# Patient Record
Sex: FEMALE | Race: WHITE
[De-identification: names, ages, dates, MRNs, and addresses within clinical notes are randomized per-mention and may not be internally consistent; named-entity substitution may affect disease eponyms.]

---

## 2018-01-02 ENCOUNTER — HOSPITAL ENCOUNTER (OUTPATIENT)
Dept: HOSPITAL 45 - C.LAB | Age: 33
Discharge: HOME | End: 2018-01-02
Attending: SPECIALIST
Payer: COMMERCIAL

## 2018-01-02 DIAGNOSIS — Z3A.00: ICD-10-CM

## 2018-01-02 DIAGNOSIS — O09.00: Primary | ICD-10-CM

## 2018-01-04 ENCOUNTER — HOSPITAL ENCOUNTER (OUTPATIENT)
Dept: HOSPITAL 45 - C.ULTR | Age: 33
Discharge: HOME | End: 2018-01-04
Attending: SPECIALIST
Payer: COMMERCIAL

## 2018-01-04 DIAGNOSIS — N83.201: ICD-10-CM

## 2018-01-04 DIAGNOSIS — N97.9: Primary | ICD-10-CM

## 2018-01-04 DIAGNOSIS — N83.202: ICD-10-CM

## 2018-01-04 NOTE — DIAGNOSTIC IMAGING REPORT
TRANSVAG-FEMALE PELVIS



HISTORY: Infertility  INFERTILIY **STAT



COMPARISON: Hysterosalpingogram 12/13/2016



FINDINGS: 

Uterus: 6.8 cm maximum dimension  



Endometrial stripe: 3 mm 



Right ovary: 3.0 cm maximum dimension with normal vascular flow. 5 small

follicular cysts identified with the largest measuring 1.1 cm.



Left ovary: 2.7 cm maximum dimension with normal vascular flow. 7 follicular

cysts identified with the largest measuring 1.6 cm



Miscellaneous:Trace physiologic free fluid



IMPRESSION:  



1. Bilateral ovarian follicular cysts as described.





2. Normal vascular flow to both ovaries.

3. Study is otherwise normal.









The above report was generated using voice recognition software.  It may contain

grammatical, syntax or spelling errors.







Electronically signed by:  Adeel Campbell M.D.

1/4/2018 3:22 PM



Dictated Date/Time:  1/4/2018 3:19 PM

## 2018-01-05 ENCOUNTER — HOSPITAL ENCOUNTER (OUTPATIENT)
Dept: HOSPITAL 45 - C.LAB | Age: 33
Discharge: HOME | End: 2018-01-05
Attending: SPECIALIST
Payer: COMMERCIAL

## 2018-01-05 DIAGNOSIS — Z31.41: Primary | ICD-10-CM

## 2018-02-23 ENCOUNTER — HOSPITAL ENCOUNTER (OUTPATIENT)
Dept: HOSPITAL 45 - C.LAB | Age: 33
Discharge: HOME | End: 2018-02-23
Attending: SPECIALIST
Payer: COMMERCIAL

## 2018-02-23 DIAGNOSIS — O09.00: Primary | ICD-10-CM

## 2018-02-23 DIAGNOSIS — Z3A.00: ICD-10-CM

## 2018-04-20 ENCOUNTER — HOSPITAL ENCOUNTER (OUTPATIENT)
Dept: HOSPITAL 45 - C.LAB | Age: 33
End: 2018-04-20
Attending: SPECIALIST
Payer: COMMERCIAL

## 2018-04-20 DIAGNOSIS — O09.00: Primary | ICD-10-CM

## 2018-04-20 DIAGNOSIS — Z3A.00: ICD-10-CM

## 2018-04-23 ENCOUNTER — HOSPITAL ENCOUNTER (OUTPATIENT)
Dept: HOSPITAL 45 - C.LAB | Age: 33
Discharge: HOME | End: 2018-04-23
Attending: SPECIALIST
Payer: COMMERCIAL

## 2018-04-23 DIAGNOSIS — Z3A.00: ICD-10-CM

## 2018-04-23 DIAGNOSIS — O09.00: Primary | ICD-10-CM

## 2018-04-25 ENCOUNTER — HOSPITAL ENCOUNTER (OUTPATIENT)
Dept: HOSPITAL 45 - C.LAB | Age: 33
Discharge: HOME | End: 2018-04-25
Attending: SPECIALIST
Payer: COMMERCIAL

## 2018-04-25 DIAGNOSIS — O09.00: Primary | ICD-10-CM

## 2018-04-27 ENCOUNTER — HOSPITAL ENCOUNTER (OUTPATIENT)
Dept: HOSPITAL 45 - C.LAB | Age: 33
Discharge: HOME | End: 2018-04-27
Attending: SPECIALIST
Payer: COMMERCIAL

## 2018-04-27 DIAGNOSIS — Z3A.00: ICD-10-CM

## 2018-04-27 DIAGNOSIS — O09.00: Primary | ICD-10-CM

## 2018-05-03 ENCOUNTER — HOSPITAL ENCOUNTER (OUTPATIENT)
Dept: HOSPITAL 45 - C.LAB | Age: 33
Discharge: HOME | End: 2018-05-03
Attending: SPECIALIST
Payer: COMMERCIAL

## 2018-05-03 DIAGNOSIS — O09.00: Primary | ICD-10-CM

## 2018-05-11 ENCOUNTER — HOSPITAL ENCOUNTER (OUTPATIENT)
Dept: HOSPITAL 45 - C.LAB | Age: 33
Discharge: HOME | End: 2018-05-11
Attending: SPECIALIST
Payer: COMMERCIAL

## 2018-05-11 DIAGNOSIS — Z3A.00: ICD-10-CM

## 2018-05-11 DIAGNOSIS — O09.00: Primary | ICD-10-CM

## 2018-05-11 DIAGNOSIS — N96: Primary | ICD-10-CM

## 2018-05-12 LAB — HBA1C MFR BLD: 5.1 % (ref 4.5–5.6)
